# Patient Record
Sex: MALE | Race: ASIAN | NOT HISPANIC OR LATINO | Employment: FULL TIME | URBAN - METROPOLITAN AREA
[De-identification: names, ages, dates, MRNs, and addresses within clinical notes are randomized per-mention and may not be internally consistent; named-entity substitution may affect disease eponyms.]

---

## 2023-04-01 ENCOUNTER — HOSPITAL ENCOUNTER (EMERGENCY)
Facility: HOSPITAL | Age: 48
Discharge: HOME | End: 2023-04-01
Attending: STUDENT IN AN ORGANIZED HEALTH CARE EDUCATION/TRAINING PROGRAM | Admitting: STUDENT IN AN ORGANIZED HEALTH CARE EDUCATION/TRAINING PROGRAM
Payer: COMMERCIAL

## 2023-04-01 VITALS
WEIGHT: 190 LBS | RESPIRATION RATE: 18 BRPM | SYSTOLIC BLOOD PRESSURE: 164 MMHG | OXYGEN SATURATION: 96 % | HEIGHT: 67 IN | HEART RATE: 100 BPM | DIASTOLIC BLOOD PRESSURE: 97 MMHG | TEMPERATURE: 97.1 F | BODY MASS INDEX: 29.82 KG/M2

## 2023-04-01 DIAGNOSIS — N48.89 PENILE PAIN: ICD-10-CM

## 2023-04-01 LAB
ALBUMIN UR-MCNC: NEGATIVE MG/DL
APPEARANCE UR: CLEAR
BILIRUB UR QL STRIP: NEGATIVE
COLOR UR AUTO: ABNORMAL
GLUCOSE UR STRIP-MCNC: NEGATIVE MG/DL
HGB UR QL STRIP: NEGATIVE
KETONES UR STRIP-MCNC: NEGATIVE MG/DL
LEUKOCYTE ESTERASE UR QL STRIP: NEGATIVE
NITRATE UR QL: NEGATIVE
PH UR STRIP: 7.5 [PH] (ref 5–7)
RBC URINE: <1 /HPF
SP GR UR STRIP: 1.02 (ref 1–1.03)
UROBILINOGEN UR STRIP-MCNC: <2 MG/DL
WBC URINE: 0 /HPF

## 2023-04-01 PROCEDURE — 51798 US URINE CAPACITY MEASURE: CPT

## 2023-04-01 PROCEDURE — 87563 M. GENITALIUM AMP PROBE: CPT | Performed by: STUDENT IN AN ORGANIZED HEALTH CARE EDUCATION/TRAINING PROGRAM

## 2023-04-01 PROCEDURE — 99284 EMERGENCY DEPT VISIT MOD MDM: CPT

## 2023-04-01 PROCEDURE — 81001 URINALYSIS AUTO W/SCOPE: CPT | Performed by: STUDENT IN AN ORGANIZED HEALTH CARE EDUCATION/TRAINING PROGRAM

## 2023-04-01 ASSESSMENT — ACTIVITIES OF DAILY LIVING (ADL)
ADLS_ACUITY_SCORE: 35
ADLS_ACUITY_SCORE: 35

## 2023-04-01 NOTE — ED PROVIDER NOTES
EMERGENCY DEPARTMENT ENCOUNTER       ED Course & Medical Decision Making     2:43 PM I met with the patient for the initial interview and physical examination. Discussed plan for treatment and workup in the ED. PPE: Provider wore gloves, and paper mask.    3:20 PM Spoke with NEVILLE Reddy, Urology.   5:24 PM The patient's post-void residual bladder scan showed 80 mL in the bladder. I rechecked and updated the patient.     Final Impression  47 year old male presents for evaluation of pain at the tip of his penis ongoing for the last 3 months or so, also recently noticed 3 small red bumps along the rim of the glans of his penis over the last few days.  Patient reports unprotected intercourse with a former partner, last intercourse was 12/3/2022, shortly thereafter he began having some discomfort at the tip of his penis described as almost a pinching or a light burning that is near constant.  Denies any penile discharge.  Denies scrotal pain.  Seen at [facility] for the same issue on 1/26/23, had gonorrhea, chlamydia, hepatitis, HIV and UA that were all negative.  Subsequent primary care virtual appointment on 3/24, also discussed the penile pain at that visit, they recommended trialing hydrocortisone to the tip of his penis which patient has been doing, though in the last few days has noticed a few red dots along the rim of his glans.  On exam patient has no discharge from the urethra meatus, no bleeding.  No testicular pain, no lumps in his groin on exam.  Relatively normal-appearing glans and phallus, able to fully retract foreskin.  The few tiny red bumps that I do see do not appear pathologic.  Spoke with urology, they agreed with testing for atypicals like mycoplasma, also recommended checking a postvoid residual, rechecking UA to ensure that these were normal.  Both UA and postvoid residual were fairly unremarkable.  Discussed with patient that we will contact him if the mycoplasma test results returned  requiring treatment.  Otherwise can follow-up with his primary doctor or potentially urology if he continues to have symptoms.  All questions answered    Prior to making a final disposition on this patient the results of patient's tests and other diagnostic studies were discussed with the patient. All questions were answered. Patient expressed understanding of the plan and was amenable to it.    Medical Decision Making    History:    Supplemental history from: Documented in chart, if applicable    External Record(s) reviewed: Documented in chart, if applicable.   o 1/26/23 labs from [facility] outpatient visit, lab results for gonorrhea, chlamydia, hep B, hep C, HIV all negative.  UA appears normal.  o 3/24/23 virtual primary care visit from [facility], recommended hydrocortisone to the tip of penis for itching    Work Up:    Chart documentation includes differential considered and any EKGs or imaging independently interpreted by provider, where specified.    In additional to work up documented, I considered the following work up: Documented in chart, if applicable.    External consultation:    Discussion of management with another provider: Urology    Complicating factors:    Care impacted by chronic illness: Hyperlipidemia    Care affected by social determinants of health: N/A    Disposition considerations: Discharge. No recommendations on prescription strength medication(s). See documentation for any additional details.    Medications - No data to display    New Prescriptions    No medications on file       Final Impression     1. Penile pain        Chief Complaint     Chief Complaint   Patient presents with     Sore     penis     Pt presents with concerns about 3 small lesions on the tip of his penis. Pt states he took a blood and urine STD test and they were negative. He was using hydrocortisone cream and stopped and taht is when he noticed the red spots.     HPI     Avtar Lopez is a 47 year old male with a history of  hyperlipidemia who presents for evaluation of penile pain.     Per Chart Review:   1/26/23 The patient presented to [facility] for evaluation of a pinching sensation at the tip of his penis. His UA, gonorrhea, Hepatitis B, Hepatitis C, and HIV testing were negative.     The patient reports that he has had pain around the tip of his penis since 12/3, when he last had sexual intercourse with a girlfriend he has now broken up with. He states that he saw his primary care provider in January and had a UA and blood and urine STD testing done, all of which was negative. He also saw his primary care provider on 3/24 and they recommended him putting hydrocortisone on the tip of his penis as his pain had continued, so he has been doing that for the last week, but the pain has persisted. The patient denies dysuria, hematuria, testicular pain, and any other symptoms or complaints at this time.     I, Mariann Radha am serving as a scribe to document services personally performed by Dr. Juvenal Corado MD, based on my observation and the provider's statements to me. I, Dr. Juvenal Corado MD attest that Mariann Garcia is acting in a scribe capacity, has observed my performance of the services and has documented them in accordance with my direction.    Past Medical History     History reviewed. No pertinent past medical history.  History reviewed. No pertinent surgical history.  Family History   Problem Relation Age of Onset     Coronary Artery Disease Brother       Social History     Tobacco Use     Smoking status: Never   Substance Use Topics     Alcohol use: Yes     Comment: Alcoholic Drinks/day: casual     No Known Allergies    Relevant past medical, surgical, family and social history as documented above, has been reviewed and discussed with patient. No changes or additions, unless otherwise noted in the HPI.    Current Medications     No current outpatient medications on file.      Physical Exam     BP (!)  "164/97   Pulse 100   Temp 97.1  F (36.2  C) (Temporal)   Resp 18   Ht 1.702 m (5' 7\")   Wt 86.2 kg (190 lb)   SpO2 96%   BMI 29.76 kg/m    Constitutional: Awake, alert, in no acute distress.  Head: Normocephalic, atraumatic.  ENT: Mucous membranes moist.  Eyes: Conjunctiva normal.  Respiratory: Respirations even, unlabored, in no acute respiratory distress.  Cardiovascular: Regular rate and rhythm. Good peripheral perfusion.  GI: Abdomen soft, non-tender.  : Normal-appearing glans and phallus, able to retract foreskin fully, few very tiny red bumps that do not appear pathologic along the rim of the glans. No discharge at the urethral meatus, no bleeding at the meatus.  No rashes or bumps anywhere else in the groin or along the shaft of penis noted.  Musculoskeletal: Moves all 4 extremities equally.  Integument: Warm, dry.  Neurologic: Alert & oriented x 3. Normal speech. Grossly normal motor and sensory function. No focal deficits noted.  Psychiatric: Normal mood    Labs & Imaging     Results for orders placed or performed during the hospital encounter of 04/01/23   UA with Microscopic reflex to Culture    Specimen: Urine, Midstream   Result Value Ref Range    Color Urine Light Yellow Colorless, Straw, Light Yellow, Yellow    Appearance Urine Clear Clear    Glucose Urine Negative Negative mg/dL    Bilirubin Urine Negative Negative    Ketones Urine Negative Negative mg/dL    Specific Gravity Urine 1.022 1.001 - 1.030    Blood Urine Negative Negative    pH Urine 7.5 (H) 5.0 - 7.0    Protein Albumin Urine Negative Negative mg/dL    Urobilinogen Urine <2.0 <2.0 mg/dL    Nitrite Urine Negative Negative    Leukocyte Esterase Urine Negative Negative    RBC Urine <1 <=2 /HPF    WBC Urine 0 <=5 /HPF          Juvenal Corado MD  04/01/23 1844    "

## 2023-04-01 NOTE — ED TRIAGE NOTES
Pt presents with concerns about 3 small lesions on the tip of his penis. Pt states he took a blood and urine STD test and they were negative. He was using hydrocortisone cream and stopped and taht is when he noticed the red spots.      Triage Assessment     Row Name 04/01/23 5969       Triage Assessment (Adult)    Airway WDL WDL       Respiratory WDL    Respiratory WDL WDL       Skin Circulation/Temperature WDL    Skin Circulation/Temperature WDL WDL       Cardiac WDL    Cardiac WDL WDL       Peripheral/Neurovascular WDL    Peripheral Neurovascular WDL WDL       Cognitive/Neuro/Behavioral WDL    Cognitive/Neuro/Behavioral WDL WDL

## 2023-04-01 NOTE — ED NOTES
Pt c/o red spots on the tip of his penis; pt reports taking an std test that was negative; no other c/o; pt a&ox4;

## 2023-04-01 NOTE — DISCHARGE INSTRUCTIONS
The test we are waiting on is called mycoplasma, we will contact you if the results are abnormal and need treatment.

## 2023-04-07 LAB
M GENITALIUM DNA SPEC QL NAA+PROBE: NOT DETECTED
M HOMINIS DNA SPEC QL NAA+PROBE: NOT DETECTED
U PARVUM DNA SPEC QL NAA+PROBE: NOT DETECTED
U UREALYTICUM DNA SPEC QL NAA+PROBE: NOT DETECTED